# Patient Record
Sex: FEMALE | Race: WHITE | HISPANIC OR LATINO | Employment: UNEMPLOYED | URBAN - METROPOLITAN AREA
[De-identification: names, ages, dates, MRNs, and addresses within clinical notes are randomized per-mention and may not be internally consistent; named-entity substitution may affect disease eponyms.]

---

## 2023-05-24 ENCOUNTER — HOSPITAL ENCOUNTER (EMERGENCY)
Facility: CLINIC | Age: 53
Discharge: HOME | End: 2023-05-24
Attending: EMERGENCY MEDICINE | Admitting: EMERGENCY MEDICINE
Payer: COMMERCIAL

## 2023-05-24 VITALS
SYSTOLIC BLOOD PRESSURE: 113 MMHG | HEART RATE: 77 BPM | RESPIRATION RATE: 14 BRPM | TEMPERATURE: 99.1 F | OXYGEN SATURATION: 97 % | DIASTOLIC BLOOD PRESSURE: 86 MMHG

## 2023-05-24 DIAGNOSIS — S01.01XA LACERATION OF SCALP, INITIAL ENCOUNTER: ICD-10-CM

## 2023-05-24 PROCEDURE — 12002 RPR S/N/AX/GEN/TRNK2.6-7.5CM: CPT

## 2023-05-24 PROCEDURE — 99283 EMERGENCY DEPT VISIT LOW MDM: CPT

## 2023-05-24 RX ORDER — LOSARTAN POTASSIUM 100 MG/1
160 TABLET ORAL DAILY
COMMUNITY

## 2023-05-24 NOTE — ED PROVIDER NOTES
"  History     Chief Complaint:  Fall and Head Injury       A  was used (Casimiro translated (Czech)).      Beti Francisco is a 53 year old female who presents today via EMS for evaluation following a fall. She was in the bathtub earlier this morning when she slipped and fell, hitting her head. She sustained a wound on her posterior scalp and was bleeding and also complains of a headache. Her  shared that she had some finger numbness following the trauma but that has since resolved. She denies neck pain and is not on any blood thinners.    Independent Historian:   Her  translates as supplemented above.    Review of External Notes:   none    Medications:    Losartan    Past Medical History:    Hypertension     Physical Exam     Patient Vitals for the past 24 hrs:   BP Temp Temp src Pulse Resp SpO2   05/24/23 0533 139/86 99.1  F (37.3  C) Oral 93 14 99 %   05/24/23 0521 -- 99.1  F (37.3  C) Temporal -- -- --        Physical Exam  General/Appearance: appears stated age, well-groomed, appears comfortable, 1.5\" laceration to inferior posterior scalp  Eyes: EOMI, no scleral injection, no icterus  ENT: MMM  Neck: supple, nl ROM, no stiffness  Cardiovascular: RRR, nl S1S2, no m/r/g, 2+ pulses in all 4 extremities, cap refill <2sec  Respiratory: CTAB, good air movement throughout, no wheezes/rhonchi/rales, no increased WOB, no retractions  Back: no lesions  GI: abd soft, non-distended, nttp,  no HSM, no rebound, no guarding, nl BS  MSK: MATTA, good tone, no bony abnormality  Skin: warm and well-perfused, no rash, no edema, no ecchymosis, nl turgor  Neuro: GCS 15, alert, no gross focal neuro deficits  Psych: interacts appropriately  Heme: no petechia, no purpura        Emergency Department Course     Procedures     Laceration Repair      Procedure: Laceration Repair    Indication: Laceration    Consent: Verbal    Location: Scalp     Length: 4 cm    Preparation: Irrigation with Sterile " Saline.    Anesthesia/Sedation: Bupivacaine - 0.5%      Treatment/Exploration: Wound explored, no foreign bodies found     Closure: The wound was closed with 6 staples.    Patient Status: The patient tolerated the procedure well: Yes. There were no complications.    Emergency Department Course & Assessments:       Interventions:  Medications - No data to display     Assessments:  0521 I obtained history and examined the patient as noted above.   0548 I performed a laceration repair. See note above. At this time patient was deemed safe to return home.     Independent Interpretation (X-rays, CTs, rhythm strip):  None    Consultations/Discussion of Management or Tests:  None        Social Determinants of Health affecting care:   English as a second language    Disposition:  The patient was discharged to home.     Impression & Plan    CMS Diagnoses: None    Medical Decision Making:  This patient is a 53-year-old female who presents after mechanical fall with scalp laceration.  She did strike her head but is not on any blood thinners.  She did not have any loss of consciousness, has no headache, no current focal neurologic symptoms, no nausea or vomiting.  I have low suspicion for intracranial bleed or scalp fracture.  I do not think head CT is needed.  Additionally she has no neck pain, has full range of motion, did not have any direct trauma to the neck so I do not think x-rays are needed.  The scalp wound was cleaned and irrigated and stapled.  Staples need to be removed in 10 to 14 days.    Diagnosis:    ICD-10-CM    1. Laceration of scalp, initial encounter  S01.01XA            Discharge Medications:  New Prescriptions    No medications on file      Scribe Disclosure:  Didi POE, am serving as a scribe  at 5:47 AM on 5/24/2023 to document services personally performed by Tonya Underwood MD based on my observations and the provider's statements to me.    Scribe Disclosure:  Néstor POE,  am serving as a scribe at 5:32 AM on 5/24/2023 to document services personally performed by Tonya Underwood MD based on my observations and the provider's statements to me.     5/24/2023   Tonya Underwood MD Mahoney, Katherine Collins, MD  05/24/23 0625       Tonya Underwood MD  05/24/23 0625

## 2023-05-24 NOTE — ED TRIAGE NOTES
Patient BIBA from hotel, Patient was opening bathroom door and somehow lost balance and fell back into the tub, hitting the back of head. Complained of tingling in feet at time of incident which had resolved by the time EMS arrived.   EMS gave Tylenol 650mg en route.     Triage Assessment     Row Name 05/24/23 0566       Triage Assessment (Adult)    Airway WDL WDL       Respiratory WDL    Respiratory WDL WDL       Skin Circulation/Temperature WDL    Skin Circulation/Temperature WDL WDL       Cardiac WDL    Cardiac WDL WDL       Peripheral/Neurovascular WDL    Peripheral Neurovascular WDL WDL       Cognitive/Neuro/Behavioral WDL    Cognitive/Neuro/Behavioral WDL WDL